# Patient Record
Sex: FEMALE | URBAN - METROPOLITAN AREA
[De-identification: names, ages, dates, MRNs, and addresses within clinical notes are randomized per-mention and may not be internally consistent; named-entity substitution may affect disease eponyms.]

---

## 2020-12-15 ENCOUNTER — OFFICE VISIT (OUTPATIENT)
Dept: URBAN - METROPOLITAN AREA TELEHEALTH 2 | Facility: TELEHEALTH | Age: 55
End: 2020-12-15
Payer: COMMERCIAL

## 2020-12-15 DIAGNOSIS — R15.9 INCONTINENCE OF BOWEL: ICD-10-CM

## 2020-12-15 DIAGNOSIS — K21.9 GERD: ICD-10-CM

## 2020-12-15 DIAGNOSIS — R10.84 GENERALIZED ABDOMINAL PAIN: ICD-10-CM

## 2020-12-15 DIAGNOSIS — R19.7 DIARRHEA: ICD-10-CM

## 2020-12-15 PROCEDURE — 99203 OFFICE O/P NEW LOW 30 MIN: CPT | Performed by: NURSE PRACTITIONER

## 2020-12-15 RX ORDER — DICYCLOMINE HYDROCHLORIDE 20 MG/1
1 TABLET TABLET ORAL
Qty: 120 TABLET | Refills: 6 | OUTPATIENT
Start: 2020-12-15 | End: 2021-07-13

## 2020-12-15 RX ORDER — SODIUM, POTASSIUM,MAG SULFATES 17.5-3.13G
354 ML SOLUTION, RECONSTITUTED, ORAL ORAL
Qty: 354 ML | Refills: 0 | OUTPATIENT
Start: 2020-12-15 | End: 2020-12-16

## 2020-12-15 NOTE — HPI-TODAY'S VISIT:
Ms Roberts is a 56 yo F who presents with [facility] GI complaints. She chronically has periods of loose stool and abdominal pain. SHe states she was dx with IBS in her 40s with no testing or treatment given. She was just told to take prn IBgard. She has lower cramping with 3-4 loose stools daily. There is occasionally blood and nocturnal symptoms. She often passing stool while coughing. SHe also reports 3 episodes of diverticulitis over the last 3 years but she was only given antibx once and never had a CT scan. She also c/o daily heartburn which is relieved with omeprazole 20mg, which she takes nightlys. No autoimmune in the family. maternal grandmother had crc at age 76. she has never had an endoscopy or colonoscopy. no blood thinners. Otherwise, she is a heatlhy an artist. SB  Medications:  low dose hormone-estrogen  Wellbutrin 150mg  prilosec 20mg   PMH: hysterectomy  4 c-sections

## 2021-01-27 ENCOUNTER — OFFICE VISIT (OUTPATIENT)
Dept: URBAN - NONMETROPOLITAN AREA SURGERY CENTER 1 | Facility: SURGERY CENTER | Age: 56
End: 2021-01-27
Payer: COMMERCIAL

## 2021-01-27 DIAGNOSIS — K21.00 ALKALINE REFLUX ESOPHAGITIS: ICD-10-CM

## 2021-01-27 DIAGNOSIS — R10.84 ABDOMINAL CRAMPING, GENERALIZED: ICD-10-CM

## 2021-01-27 DIAGNOSIS — R19.4 ALTERED BOWEL HABITS: ICD-10-CM

## 2021-01-27 DIAGNOSIS — K52.89 (LYMPHOCYTIC) MICROSCOPIC COLITIS: ICD-10-CM

## 2021-01-27 DIAGNOSIS — K31.89 ACQUIRED DEFORMITY OF DUODENUM: ICD-10-CM

## 2021-01-27 PROCEDURE — 43239 EGD BIOPSY SINGLE/MULTIPLE: CPT | Performed by: INTERNAL MEDICINE

## 2021-01-27 PROCEDURE — G8907 PT DOC NO EVENTS ON DISCHARG: HCPCS | Performed by: INTERNAL MEDICINE

## 2021-01-27 PROCEDURE — 45380 COLONOSCOPY AND BIOPSY: CPT | Performed by: INTERNAL MEDICINE

## 2021-01-30 ENCOUNTER — WEB ENCOUNTER (OUTPATIENT)
Dept: URBAN - NONMETROPOLITAN AREA CLINIC 2 | Facility: CLINIC | Age: 56
End: 2021-01-30

## 2021-02-05 ENCOUNTER — TELEPHONE ENCOUNTER (OUTPATIENT)
Dept: URBAN - METROPOLITAN AREA CLINIC 92 | Facility: CLINIC | Age: 56
End: 2021-02-05

## 2021-02-05 RX ORDER — DICYCLOMINE HYDROCHLORIDE 20 MG/1
1 TABLET TABLET ORAL
Qty: 120 TABLET | Refills: 6 | Status: ACTIVE | COMMUNITY
Start: 2020-12-15 | End: 2021-07-13

## 2021-02-05 RX ORDER — RIFAXIMIN 550 MG/1
1 TABLET TABLET ORAL THREE TIMES A DAY
Qty: 42 TABLET | Refills: 2 | OUTPATIENT
Start: 2021-02-09 | End: 2021-03-23

## 2021-02-25 ENCOUNTER — OFFICE VISIT (OUTPATIENT)
Dept: URBAN - NONMETROPOLITAN AREA CLINIC 2 | Facility: CLINIC | Age: 56
End: 2021-02-25

## 2021-02-25 PROBLEM — 235595009 GASTROESOPHAGEAL REFLUX DISEASE: Status: ACTIVE | Noted: 2020-12-15

## 2021-02-25 PROBLEM — 72042002 INCONTINENCE OF FECES: Status: ACTIVE | Noted: 2020-12-15

## 2021-02-25 RX ORDER — RIFAXIMIN 550 MG/1
1 TABLET TABLET ORAL THREE TIMES A DAY
Qty: 42 TABLET | Refills: 2 | Status: ACTIVE | COMMUNITY
Start: 2021-02-09 | End: 2021-03-23

## 2021-02-25 RX ORDER — DICYCLOMINE HYDROCHLORIDE 20 MG/1
1 TABLET TABLET ORAL
Qty: 120 TABLET | Refills: 6 | Status: ACTIVE | COMMUNITY
Start: 2020-12-15 | End: 2021-07-13

## 2021-02-25 NOTE — HPI-TODAY'S VISIT:
Ms Roberts is a 56 yo F who presents with [facility] GI complaints. She chronically has periods of loose stool and abdominal pain. SHe states she was dx with IBS in her 40s with no testing or treatment given. She was just told to take prn IBgard. She has lower cramping with 3-4 loose stools daily. There is occasionally blood and nocturnal symptoms. She often passing stool while coughing. SHe also reports 3 episodes of diverticulitis over the last 3 years but she was only given antibx once and never had a CT scan. She also c/o daily heartburn which is relieved with omeprazole 20mg, which she takes nightlys. No autoimmune in the family. maternal grandmother had crc at age 76. she has never had an endoscopy or colonoscopy. no blood thinners. Otherwise, she is a heatlhy an artist. SB  2/24/2021

## 2024-05-15 ENCOUNTER — DASHBOARD ENCOUNTERS (OUTPATIENT)
Age: 59
End: 2024-05-15

## 2024-05-15 ENCOUNTER — OFFICE VISIT (OUTPATIENT)
Dept: URBAN - NONMETROPOLITAN AREA CLINIC 2 | Facility: CLINIC | Age: 59
End: 2024-05-15
Payer: COMMERCIAL

## 2024-05-15 VITALS
DIASTOLIC BLOOD PRESSURE: 82 MMHG | WEIGHT: 238 LBS | HEART RATE: 85 BPM | TEMPERATURE: 98 F | SYSTOLIC BLOOD PRESSURE: 128 MMHG

## 2024-05-15 DIAGNOSIS — Z80.0 FAMILY HISTORY OF COLON CANCER: ICD-10-CM

## 2024-05-15 DIAGNOSIS — K21.9 GERD: ICD-10-CM

## 2024-05-15 DIAGNOSIS — R19.7 DIARRHEA: ICD-10-CM

## 2024-05-15 DIAGNOSIS — R10.9 ABDOMINAL PAIN: ICD-10-CM

## 2024-05-15 DIAGNOSIS — R15.9 INCONTINENCE OF BOWEL: ICD-10-CM

## 2024-05-15 DIAGNOSIS — R85.7 ABNORMAL SMALL BOWEL BIOPSY: ICD-10-CM

## 2024-05-15 PROCEDURE — 99214 OFFICE O/P EST MOD 30 MIN: CPT | Performed by: NURSE PRACTITIONER

## 2024-05-15 RX ORDER — DICYCLOMINE HYDROCHLORIDE 10 MG/1
1 CAPSULES CAPSULE ORAL
Qty: 60 | Refills: 6 | OUTPATIENT
Start: 2024-05-15 | End: 2024-12-10

## 2024-05-15 RX ORDER — BUDESONIDE 3 MG/1
3 CAPSULES CAPSULE, DELAYED RELEASE PELLETS ORAL ONCE A DAY
Qty: 90 CAPSULE | Refills: 3 | OUTPATIENT
Start: 2024-05-15

## 2024-05-19 LAB
ABSOLUTE BASOPHILS: 41
ABSOLUTE EOSINOPHILS: 102
ABSOLUTE LYMPHOCYTES: 1836
ABSOLUTE MONOCYTES: 362
ABSOLUTE NEUTROPHILS: 2759
ANCA SCREEN: NEGATIVE
BASOPHILS: 0.8
C-REACTIVE PROTEIN, QUANT: 4.2
EOSINOPHILS: 2
GASCA: 5.9
HEMATOCRIT: 48.4
HEMOGLOBIN: 16.2
LYMPHOCYTES: 36
MCH: 30.7
MCHC: 33.5
MCV: 91.8
MONOCYTES: 7.1
MPV: 8.4
MYELOPEROXIDASE ANTIBODY: <1
NEUTROPHILS: 54.1
PLATELET COUNT: 342
PROTEINASE-3 ANTIBODY: <1
RDW: 12.2
RED BLOOD CELL COUNT: 5.27
SACCHAROMYCES CEREVISIAE AB (ASCA) (IGA): 5.4
WHITE BLOOD CELL COUNT: 5.1

## 2024-05-21 ENCOUNTER — WEB ENCOUNTER (OUTPATIENT)
Dept: URBAN - NONMETROPOLITAN AREA CLINIC 2 | Facility: CLINIC | Age: 59
End: 2024-05-21

## 2024-05-22 ENCOUNTER — TELEPHONE ENCOUNTER (OUTPATIENT)
Dept: URBAN - NONMETROPOLITAN AREA CLINIC 2 | Facility: CLINIC | Age: 59
End: 2024-05-22

## 2024-05-22 LAB — CALPROTECTIN, FECAL: 352

## 2024-05-23 ENCOUNTER — WEB ENCOUNTER (OUTPATIENT)
Dept: URBAN - NONMETROPOLITAN AREA CLINIC 2 | Facility: CLINIC | Age: 59
End: 2024-05-23

## 2024-07-10 ENCOUNTER — LAB OUTSIDE AN ENCOUNTER (OUTPATIENT)
Dept: URBAN - NONMETROPOLITAN AREA CLINIC 2 | Facility: CLINIC | Age: 59
End: 2024-07-10

## 2024-07-10 ENCOUNTER — OFFICE VISIT (OUTPATIENT)
Dept: URBAN - NONMETROPOLITAN AREA CLINIC 2 | Facility: CLINIC | Age: 59
End: 2024-07-10
Payer: COMMERCIAL

## 2024-07-10 VITALS
SYSTOLIC BLOOD PRESSURE: 153 MMHG | DIASTOLIC BLOOD PRESSURE: 78 MMHG | HEART RATE: 83 BPM | WEIGHT: 232 LBS | HEIGHT: 65 IN | TEMPERATURE: 98 F | BODY MASS INDEX: 38.65 KG/M2

## 2024-07-10 DIAGNOSIS — R19.7 DIARRHEA: ICD-10-CM

## 2024-07-10 DIAGNOSIS — R10.84 ABDOMINAL CRAMPING, GENERALIZED: ICD-10-CM

## 2024-07-10 DIAGNOSIS — K21.9 GERD: ICD-10-CM

## 2024-07-10 DIAGNOSIS — R15.9 INCONTINENCE OF BOWEL: ICD-10-CM

## 2024-07-10 PROCEDURE — 99214 OFFICE O/P EST MOD 30 MIN: CPT | Performed by: NURSE PRACTITIONER

## 2024-07-10 RX ORDER — BUDESONIDE 3 MG/1
3 CAPSULES CAPSULE, DELAYED RELEASE PELLETS ORAL ONCE A DAY
Qty: 90 CAPSULE | Refills: 3 | OUTPATIENT

## 2024-07-10 RX ORDER — SODIUM, POTASSIUM,MAG SULFATES 17.5-3.13G
AS DIRECTED SOLUTION, RECONSTITUTED, ORAL ORAL
Qty: 1 | Refills: 0 | OUTPATIENT
Start: 2024-07-10 | End: 2024-07-12

## 2024-07-10 RX ORDER — DICYCLOMINE HYDROCHLORIDE 10 MG/1
1 CAPSULES CAPSULE ORAL
Qty: 60 | Refills: 6 | Status: ACTIVE | COMMUNITY
Start: 2024-05-15 | End: 2024-12-10

## 2024-07-10 RX ORDER — DICYCLOMINE HYDROCHLORIDE 10 MG/1
1 CAPSULES CAPSULE ORAL
Qty: 60 | Refills: 6 | OUTPATIENT

## 2024-07-10 RX ORDER — BUDESONIDE 3 MG/1
3 CAPSULES CAPSULE, DELAYED RELEASE PELLETS ORAL ONCE A DAY
Qty: 90 CAPSULE | Refills: 3 | Status: ACTIVE | COMMUNITY
Start: 2024-05-15

## 2024-07-10 NOTE — HPI-TODAY'S VISIT:
Patient is a pleasant 59-year-old female who presents for diarrhea.  She was last seen in 2021 and had an endoscopy and colonoscopy at that time.  She had some mild esophagitis as well as gastritis, colon with diverticulosis as well as nonspecific ileitis with a possible ulcers.  She has not been seen since follow-up.  She complains of right lower quad abdominal discomfort, alternating loose stool and constipation.  She will have a mucus and blood in her stools at times. Sb  symptoms resolved wtih budesonide, but forgot it on vacation and symptoms returned. calpro in 350s. sb

## 2024-07-25 ENCOUNTER — TELEPHONE ENCOUNTER (OUTPATIENT)
Dept: URBAN - NONMETROPOLITAN AREA CLINIC 2 | Facility: CLINIC | Age: 59
End: 2024-07-25

## 2024-07-25 LAB
HEREDITARY HEMOCHROMATOSIS DNA MUT: (no result)
IRON BIND.CAP.(TIBC): 334
IRON SATURATION: 43
IRON: 142

## 2024-09-17 ENCOUNTER — TELEPHONE ENCOUNTER (OUTPATIENT)
Dept: URBAN - NONMETROPOLITAN AREA CLINIC 2 | Facility: CLINIC | Age: 59
End: 2024-09-17

## 2024-09-17 ENCOUNTER — CLAIMS CREATED FROM THE CLAIM WINDOW (OUTPATIENT)
Dept: URBAN - NONMETROPOLITAN AREA SURGERY CENTER 1 | Facility: SURGERY CENTER | Age: 59
End: 2024-09-17
Payer: COMMERCIAL

## 2024-09-17 ENCOUNTER — CLAIMS CREATED FROM THE CLAIM WINDOW (OUTPATIENT)
Dept: URBAN - METROPOLITAN AREA CLINIC 4 | Facility: CLINIC | Age: 59
End: 2024-09-17
Payer: COMMERCIAL

## 2024-09-17 ENCOUNTER — LAB OUTSIDE AN ENCOUNTER (OUTPATIENT)
Dept: URBAN - NONMETROPOLITAN AREA CLINIC 2 | Facility: CLINIC | Age: 59
End: 2024-09-17

## 2024-09-17 DIAGNOSIS — R19.7 ACUTE DIARRHEA: ICD-10-CM

## 2024-09-17 DIAGNOSIS — K57.30 ACQUIRED DIVERTICULOSIS OF COLON: ICD-10-CM

## 2024-09-17 DIAGNOSIS — K57.30 DIVERTICULOSIS OF SIGMOID COLON: ICD-10-CM

## 2024-09-17 DIAGNOSIS — K50.00 CROHN'S DISEASE OF SMALL INTESTINE WITHOUT COMPLICATIONS: ICD-10-CM

## 2024-09-17 DIAGNOSIS — K63.3 EROSION OF TERMINAL ILEUM: ICD-10-CM

## 2024-09-17 DIAGNOSIS — K50.10 CROHN'S DISEASE, COLON: ICD-10-CM

## 2024-09-17 PROBLEM — 38106008: Status: ACTIVE | Noted: 2024-09-17

## 2024-09-17 PROCEDURE — 45380 COLONOSCOPY AND BIOPSY: CPT | Performed by: INTERNAL MEDICINE

## 2024-09-17 PROCEDURE — 00811 ANES LWR INTST NDSC NOS: CPT | Performed by: NURSE ANESTHETIST, CERTIFIED REGISTERED

## 2024-09-17 PROCEDURE — 88305 TISSUE EXAM BY PATHOLOGIST: CPT | Performed by: PATHOLOGY

## 2024-09-17 RX ORDER — DICYCLOMINE HYDROCHLORIDE 10 MG/1
1 CAPSULES CAPSULE ORAL
Qty: 60 | Refills: 6 | Status: ACTIVE | COMMUNITY

## 2024-09-17 RX ORDER — BUDESONIDE 3 MG/1
3 CAPSULES CAPSULE, DELAYED RELEASE PELLETS ORAL ONCE A DAY
Qty: 90 CAPSULE | Refills: 3 | Status: ACTIVE | COMMUNITY

## 2024-10-30 ENCOUNTER — TELEPHONE ENCOUNTER (OUTPATIENT)
Dept: URBAN - NONMETROPOLITAN AREA CLINIC 2 | Facility: CLINIC | Age: 59
End: 2024-10-30

## 2024-10-30 ENCOUNTER — OFFICE VISIT (OUTPATIENT)
Dept: URBAN - NONMETROPOLITAN AREA CLINIC 2 | Facility: CLINIC | Age: 59
End: 2024-10-30
Payer: COMMERCIAL

## 2024-10-30 VITALS
WEIGHT: 230 LBS | HEIGHT: 65 IN | BODY MASS INDEX: 38.32 KG/M2 | SYSTOLIC BLOOD PRESSURE: 121 MMHG | HEART RATE: 65 BPM | DIASTOLIC BLOOD PRESSURE: 51 MMHG | TEMPERATURE: 98 F

## 2024-10-30 DIAGNOSIS — R19.7 DIARRHEA: ICD-10-CM

## 2024-10-30 DIAGNOSIS — R15.9 INCONTINENCE OF BOWEL: ICD-10-CM

## 2024-10-30 DIAGNOSIS — R85.7 ABNORMAL SMALL BOWEL BIOPSY: ICD-10-CM

## 2024-10-30 DIAGNOSIS — R10.9 ABDOMINAL PAIN: ICD-10-CM

## 2024-10-30 DIAGNOSIS — Z80.0 FAMILY HISTORY OF COLON CANCER: ICD-10-CM

## 2024-10-30 DIAGNOSIS — K21.9 GERD: ICD-10-CM

## 2024-10-30 DIAGNOSIS — Z83.49 FAMILY HISTORY OF HEMOCHROMATOSIS: ICD-10-CM

## 2024-10-30 DIAGNOSIS — K50.011 CROHN'S DISEASE OF SMALL INTESTINE WITH RECTAL BLEEDING: ICD-10-CM

## 2024-10-30 PROBLEM — 56689002: Status: ACTIVE | Noted: 2024-10-30

## 2024-10-30 PROCEDURE — 99214 OFFICE O/P EST MOD 30 MIN: CPT | Performed by: NURSE PRACTITIONER

## 2024-10-30 RX ORDER — DICYCLOMINE HYDROCHLORIDE 10 MG/1
1 CAPSULES CAPSULE ORAL
Qty: 60 | Refills: 6 | Status: ACTIVE | COMMUNITY

## 2024-10-30 RX ORDER — BUDESONIDE 3 MG/1
3 CAPSULES CAPSULE, DELAYED RELEASE PELLETS ORAL ONCE A DAY
Qty: 90 CAPSULE | Refills: 3 | Status: ACTIVE | COMMUNITY

## 2024-10-30 RX ORDER — PREDNISONE 20 MG/1
40MG X7 DAYS, 30MGX 7 DAYS, 20MG X 7 DAYS, AND 10MG X7 DAYS AND STOP TABLET ORAL ONCE A DAY
Qty: 35 TABLET | Refills: 0 | OUTPATIENT
Start: 2024-10-30 | End: 2024-11-26

## 2024-10-30 RX ORDER — BUDESONIDE 3 MG/1
3 CAPSULES CAPSULE, DELAYED RELEASE PELLETS ORAL ONCE A DAY
Qty: 90 CAPSULE | Refills: 3 | OUTPATIENT

## 2024-10-30 RX ORDER — DICYCLOMINE HYDROCHLORIDE 10 MG/1
1 CAPSULES CAPSULE ORAL
Qty: 60 | Refills: 6 | OUTPATIENT

## 2024-10-30 NOTE — HPI-TODAY'S VISIT:
inital visit: Patient is a pleasant 59-year-old female who presents for diarrhea.  She was last seen in 2021 and had an endoscopy and colonoscopy at that time.  She had some mild esophagitis as well as gastritis, colon with diverticulosis as well as nonspecific ileitis with a possible ulcers.  She has not been seen since follow-up.  She complains of right lower quad abdominal discomfort, alternating loose stool and constipation.  She will have a mucus and blood in her stools at times. Sb  symptoms resolved wtih budesonide, but forgot it on vacation and symptoms returned. calpro in 350s. sb 2024 Colonoscopy patchy erosions TI, mild narrowing, path consistent with crohns  10/30/2024 Margie returns for follow-up to discuss newly diagnosed Crohn's.  She is doing poorly.  She is waking up at night with nocturnal complaints as well as blood in the stool and cramping.  She has lost about 20 pounds unintentionally over the last recent months.  We had a lengthy discussion about Crohn's diagnosis.  The budesonide is no longer helping, but she is agreeable to steroids today.  Her MRA is pending tomorrow and we are planning on starting her on Skyrizi. Sb

## 2024-11-04 ENCOUNTER — WEB ENCOUNTER (OUTPATIENT)
Dept: URBAN - NONMETROPOLITAN AREA CLINIC 2 | Facility: CLINIC | Age: 59
End: 2024-11-04

## 2024-11-05 ENCOUNTER — TELEPHONE ENCOUNTER (OUTPATIENT)
Dept: URBAN - NONMETROPOLITAN AREA CLINIC 2 | Facility: CLINIC | Age: 59
End: 2024-11-05

## 2024-11-05 RX ORDER — RISANKIZUMAB-RZAA 60 MG/ML
AS DIRECTED INJECTION INTRAVENOUS
Qty: 600 | Refills: 0 | OUTPATIENT
Start: 2024-11-05

## 2024-11-05 RX ORDER — RISANKIZUMAB-RZAA 360 MG/2.4
AT WEEK 12 WEARABLE INJECTOR SUBCUTANEOUS
Qty: 360 | Refills: 0 | OUTPATIENT
Start: 2024-11-05

## 2024-11-14 ENCOUNTER — TELEPHONE ENCOUNTER (OUTPATIENT)
Dept: URBAN - NONMETROPOLITAN AREA CLINIC 2 | Facility: CLINIC | Age: 59
End: 2024-11-14

## 2024-11-14 RX ORDER — AZITHROMYCIN MONOHYDRATE 250 MG/1
TAKE 2 PILLS DAILY ONE AND THEN 1 PILL DAILY FOR 4 ADDITIONAL DAYS TABLET, FILM COATED ORAL ONCE DAILY
Qty: 6 | Refills: 0 | OUTPATIENT
Start: 2024-11-14 | End: 2024-11-19

## 2024-12-05 NOTE — PHYSICAL EXAM PSYCH:
Health Maintenance       DTaP/Tdap/Td Vaccine (1 - Tdap)  Never done    Shingles Vaccine (2 of 2)  Overdue since 11/6/2024    Colorectal Cancer Risk - Colonoscopy (Every 5 Years)  Due soon on 12/17/2024           Following review of the above:  Patient is not proceeding with: Colorectal Cancer Screening, Dtap/Tdap/Td, and Shingles    Note: Refer to final orders and clinician documentation.       normal mood with appropriate affect

## 2024-12-17 ENCOUNTER — OFFICE VISIT (OUTPATIENT)
Dept: URBAN - NONMETROPOLITAN AREA CLINIC 1 | Facility: CLINIC | Age: 59
End: 2024-12-17
Payer: COMMERCIAL

## 2024-12-17 ENCOUNTER — TELEPHONE ENCOUNTER (OUTPATIENT)
Dept: URBAN - NONMETROPOLITAN AREA CLINIC 2 | Facility: CLINIC | Age: 59
End: 2024-12-17

## 2024-12-17 VITALS
HEART RATE: 79 BPM | RESPIRATION RATE: 20 BRPM | SYSTOLIC BLOOD PRESSURE: 144 MMHG | TEMPERATURE: 97.5 F | BODY MASS INDEX: 41.19 KG/M2 | HEIGHT: 65 IN | WEIGHT: 247.2 LBS | DIASTOLIC BLOOD PRESSURE: 80 MMHG

## 2024-12-17 DIAGNOSIS — K50.80 CROHN'S COLITIS: ICD-10-CM

## 2024-12-17 PROCEDURE — 96413 CHEMO IV INFUSION 1 HR: CPT | Performed by: INTERNAL MEDICINE

## 2024-12-17 RX ORDER — RISANKIZUMAB-RZAA 360 MG/2.4
AT WEEK 12 WEARABLE INJECTOR SUBCUTANEOUS
Qty: 360 | Refills: 0 | Status: ACTIVE | COMMUNITY
Start: 2024-11-05

## 2024-12-17 RX ORDER — RISANKIZUMAB-RZAA 60 MG/ML
AS DIRECTED INJECTION INTRAVENOUS
Qty: 600 | Refills: 0 | Status: ACTIVE | COMMUNITY
Start: 2024-11-05

## 2024-12-17 RX ORDER — DICYCLOMINE HYDROCHLORIDE 10 MG/1
1 CAPSULES CAPSULE ORAL
Qty: 60 | Refills: 6 | Status: ACTIVE | COMMUNITY

## 2024-12-17 RX ORDER — BUDESONIDE 3 MG/1
3 CAPSULES CAPSULE, DELAYED RELEASE PELLETS ORAL ONCE A DAY
Qty: 90 CAPSULE | Refills: 3 | Status: ACTIVE | COMMUNITY

## 2025-01-09 ENCOUNTER — OFFICE VISIT (OUTPATIENT)
Dept: URBAN - NONMETROPOLITAN AREA CLINIC 2 | Facility: CLINIC | Age: 60
End: 2025-01-09
Payer: COMMERCIAL

## 2025-01-09 VITALS
TEMPERATURE: 98 F | HEIGHT: 65 IN | BODY MASS INDEX: 40.98 KG/M2 | HEART RATE: 81 BPM | WEIGHT: 246 LBS | DIASTOLIC BLOOD PRESSURE: 72 MMHG | SYSTOLIC BLOOD PRESSURE: 136 MMHG

## 2025-01-09 DIAGNOSIS — K21.9 GERD: ICD-10-CM

## 2025-01-09 DIAGNOSIS — Z83.49 FAMILY HISTORY OF HEMOCHROMATOSIS: ICD-10-CM

## 2025-01-09 DIAGNOSIS — R79.89 LOW VITAMIN B12 LEVEL: ICD-10-CM

## 2025-01-09 DIAGNOSIS — K50.011 CROHN'S DISEASE OF SMALL INTESTINE WITH RECTAL BLEEDING: ICD-10-CM

## 2025-01-09 DIAGNOSIS — Z80.0 FAMILY HISTORY OF COLON CANCER: ICD-10-CM

## 2025-01-09 DIAGNOSIS — R85.7 ABNORMAL SMALL BOWEL BIOPSY: ICD-10-CM

## 2025-01-09 PROCEDURE — 99214 OFFICE O/P EST MOD 30 MIN: CPT | Performed by: NURSE PRACTITIONER

## 2025-01-09 RX ORDER — CYANOCOBALAMIN 1000 UG/ML
1 ML. PLEASE ALSO SUPPLY SYRINGE/NEEDLE INJECTION INTRAMUSCULAR; SUBCUTANEOUS
Qty: 4 | Refills: 11 | OUTPATIENT
Start: 2025-01-09 | End: 2026-01-04

## 2025-01-09 RX ORDER — CHOLECALCIFEROL (VITAMIN D3) 125 MCG
1 TABLET TABLET ORAL ONCE A DAY
Qty: 30 | OUTPATIENT
Start: 2025-01-09 | End: 2025-02-08

## 2025-01-09 RX ORDER — RISANKIZUMAB-RZAA 360 MG/2.4
AT WEEK 12 WEARABLE INJECTOR SUBCUTANEOUS
Qty: 360 | Refills: 0 | Status: ACTIVE | COMMUNITY
Start: 2024-11-05

## 2025-01-09 RX ORDER — BUDESONIDE 3 MG/1
3 CAPSULES CAPSULE, DELAYED RELEASE PELLETS ORAL ONCE A DAY
Qty: 90 CAPSULE | Refills: 3 | OUTPATIENT

## 2025-01-09 RX ORDER — DICYCLOMINE HYDROCHLORIDE 10 MG/1
1 CAPSULES CAPSULE ORAL
Qty: 60 | Refills: 6 | Status: ACTIVE | COMMUNITY

## 2025-01-09 RX ORDER — RISANKIZUMAB-RZAA 60 MG/ML
AS DIRECTED INJECTION INTRAVENOUS
Qty: 600 | Refills: 0 | Status: ACTIVE | COMMUNITY
Start: 2024-11-05

## 2025-01-09 RX ORDER — DICYCLOMINE HYDROCHLORIDE 10 MG/1
1 CAPSULES CAPSULE ORAL
Qty: 60 | Refills: 6 | OUTPATIENT

## 2025-01-09 NOTE — HPI-TODAY'S VISIT:
inital visit: Patient is a pleasant 59-year-old female who presents for diarrhea.  She was last seen in 2021 and had an endoscopy and colonoscopy at that time.  She had some mild esophagitis as well as gastritis, colon with diverticulosis as well as nonspecific ileitis with a possible ulcers.  She has not been seen since follow-up.  She complains of right lower quad abdominal discomfort, alternating loose stool and constipation.  She will have a mucus and blood in her stools at times. Sb  symptoms resolved wtih budesonide, but forgot it on vacation and symptoms returned. calpro in 350s. sb 2024 Colonoscopy patchy erosions TI, mild narrowing, path consistent with crohns  10/30/2024 Margie returns for follow-up to discuss newly diagnosed Crohn's.  She is doing poorly.  She is waking up at night with nocturnal complaints as well as blood in the stool and cramping.  She has lost about 20 pounds unintentionally over the last recent months.  We had a lengthy discussion about Crohn's diagnosis.  The budesonide is no longer helping, but she is agreeable to steroids today.  Her MRA is pending tomorrow and we are planning on starting her on Skyrizi. Sb 1/9/2025 Margie returns for follow-up of small bowel Crohn's.  She is currently off of steroids.  She had her first Skyrizi infusion.  She still struggling with a lot of symptoms.  She does complain of diarrhea and urgency as well as tenesmus.  She is starting to lose hair.  She did develops rash on her chest during Skyrizi infusion initially, but this resolved.  She is agreeable to proceeding with next infusion. Sb

## 2025-01-14 ENCOUNTER — OFFICE VISIT (OUTPATIENT)
Dept: URBAN - NONMETROPOLITAN AREA CLINIC 1 | Facility: CLINIC | Age: 60
End: 2025-01-14
Payer: COMMERCIAL

## 2025-01-14 VITALS
HEIGHT: 65 IN | SYSTOLIC BLOOD PRESSURE: 150 MMHG | DIASTOLIC BLOOD PRESSURE: 85 MMHG | HEART RATE: 65 BPM | BODY MASS INDEX: 41.65 KG/M2 | WEIGHT: 250 LBS | TEMPERATURE: 97.5 F

## 2025-01-14 DIAGNOSIS — K50.011 CROHN'S DISEASE OF SMALL INTESTINE WITH RECTAL BLEEDING: ICD-10-CM

## 2025-01-14 PROCEDURE — 96413 CHEMO IV INFUSION 1 HR: CPT | Performed by: INTERNAL MEDICINE

## 2025-01-14 RX ORDER — CHOLECALCIFEROL (VITAMIN D3) 125 MCG
1 TABLET TABLET ORAL ONCE A DAY
Qty: 30 | Status: ACTIVE | COMMUNITY
Start: 2025-01-09 | End: 2025-02-08

## 2025-01-14 RX ORDER — BUDESONIDE 3 MG/1
3 CAPSULES CAPSULE, DELAYED RELEASE PELLETS ORAL ONCE A DAY
Qty: 90 CAPSULE | Refills: 3 | Status: ACTIVE | COMMUNITY

## 2025-01-14 RX ORDER — RISANKIZUMAB-RZAA 60 MG/ML
AS DIRECTED INJECTION INTRAVENOUS
Qty: 600 | Refills: 0 | Status: ACTIVE | COMMUNITY
Start: 2024-11-05

## 2025-01-14 RX ORDER — DICYCLOMINE HYDROCHLORIDE 10 MG/1
1 CAPSULES CAPSULE ORAL
Qty: 60 | Refills: 6 | Status: ACTIVE | COMMUNITY

## 2025-01-14 RX ORDER — CYANOCOBALAMIN 1000 UG/ML
1 ML. PLEASE ALSO SUPPLY SYRINGE/NEEDLE INJECTION INTRAMUSCULAR; SUBCUTANEOUS
Qty: 4 | Refills: 11 | Status: ACTIVE | COMMUNITY
Start: 2025-01-09 | End: 2026-01-04

## 2025-01-14 RX ORDER — RISANKIZUMAB-RZAA 360 MG/2.4
AT WEEK 12 WEARABLE INJECTOR SUBCUTANEOUS
Qty: 360 | Refills: 0 | Status: ACTIVE | COMMUNITY
Start: 2024-11-05

## 2025-02-01 ENCOUNTER — P2P PATIENT RECORD (OUTPATIENT)
Age: 60
End: 2025-02-01

## 2025-02-07 ENCOUNTER — ERX REFILL RESPONSE (OUTPATIENT)
Dept: URBAN - NONMETROPOLITAN AREA CLINIC 2 | Facility: CLINIC | Age: 60
End: 2025-02-07

## 2025-02-07 RX ORDER — RISANKIZUMAB-RZAA 360 MG/2.4
INJECT 360MG UNDER THE SKIN AT WEEKS 12 THEN EVERY 8 WEEKS THEREAFTER KIT SUBCUTANEOUS
Qty: 2.4 MILLILITER | Refills: 0 | OUTPATIENT

## 2025-02-07 RX ORDER — RISANKIZUMAB-RZAA 360 MG/2.4
INJECT 360MG UNDER THE SKIN AT WEEKS 12 THEN EVERY 8 WEEKS THEREAFTER KIT SUBCUTANEOUS
Qty: 2.4 MILLILITER | Refills: 6 | OUTPATIENT

## 2025-02-12 ENCOUNTER — OFFICE VISIT (OUTPATIENT)
Dept: URBAN - NONMETROPOLITAN AREA CLINIC 1 | Facility: CLINIC | Age: 60
End: 2025-02-12
Payer: COMMERCIAL

## 2025-02-12 VITALS
HEIGHT: 65 IN | DIASTOLIC BLOOD PRESSURE: 84 MMHG | TEMPERATURE: 97.8 F | BODY MASS INDEX: 42.32 KG/M2 | WEIGHT: 254 LBS | SYSTOLIC BLOOD PRESSURE: 152 MMHG

## 2025-02-12 DIAGNOSIS — K50.011 CROHN'S DISEASE OF SMALL INTESTINE WITH RECTAL BLEEDING: ICD-10-CM

## 2025-02-12 PROCEDURE — 96413 CHEMO IV INFUSION 1 HR: CPT | Performed by: INTERNAL MEDICINE

## 2025-02-12 RX ORDER — RISANKIZUMAB-RZAA 60 MG/ML
AS DIRECTED INJECTION INTRAVENOUS
Qty: 600 | Refills: 0 | Status: ACTIVE | COMMUNITY
Start: 2024-11-05

## 2025-02-12 RX ORDER — CYANOCOBALAMIN 1000 UG/ML
1 ML. PLEASE ALSO SUPPLY SYRINGE/NEEDLE INJECTION INTRAMUSCULAR; SUBCUTANEOUS
Qty: 4 | Refills: 11 | Status: ACTIVE | COMMUNITY
Start: 2025-01-09 | End: 2026-01-04

## 2025-02-12 RX ORDER — RISANKIZUMAB-RZAA 360 MG/2.4
AT WEEK 12 WEARABLE INJECTOR SUBCUTANEOUS
Qty: 360 | Refills: 0 | Status: ACTIVE | COMMUNITY
Start: 2024-11-05

## 2025-02-12 RX ORDER — BUDESONIDE 3 MG/1
3 CAPSULES CAPSULE, DELAYED RELEASE PELLETS ORAL ONCE A DAY
Qty: 90 CAPSULE | Refills: 3 | Status: ACTIVE | COMMUNITY

## 2025-02-12 RX ORDER — RISANKIZUMAB-RZAA 360 MG/2.4
INJECT 360MG UNDER THE SKIN AT WEEKS 12 THEN EVERY 8 WEEKS THEREAFTER KIT SUBCUTANEOUS
Qty: 2.4 MILLILITER | Refills: 6 | Status: ACTIVE | COMMUNITY

## 2025-02-12 RX ORDER — DICYCLOMINE HYDROCHLORIDE 10 MG/1
1 CAPSULES CAPSULE ORAL
Qty: 60 | Refills: 6 | Status: ACTIVE | COMMUNITY

## 2025-04-04 ENCOUNTER — OFFICE VISIT (OUTPATIENT)
Dept: URBAN - NONMETROPOLITAN AREA CLINIC 2 | Facility: CLINIC | Age: 60
End: 2025-04-04
Payer: COMMERCIAL

## 2025-04-04 DIAGNOSIS — Z83.49 FAMILY HISTORY OF HEMOCHROMATOSIS: ICD-10-CM

## 2025-04-04 DIAGNOSIS — R79.89 LOW VITAMIN B12 LEVEL: ICD-10-CM

## 2025-04-04 DIAGNOSIS — K50.011 CROHN'S DISEASE OF SMALL INTESTINE WITH RECTAL BLEEDING: ICD-10-CM

## 2025-04-04 DIAGNOSIS — K21.9 GERD: ICD-10-CM

## 2025-04-04 DIAGNOSIS — K59.09 CHRONIC CONSTIPATION: ICD-10-CM

## 2025-04-04 DIAGNOSIS — Z80.0 FAMILY HISTORY OF COLON CANCER: ICD-10-CM

## 2025-04-04 PROCEDURE — 99214 OFFICE O/P EST MOD 30 MIN: CPT | Performed by: INTERNAL MEDICINE

## 2025-04-04 RX ORDER — RISANKIZUMAB-RZAA 360 MG/2.4
AT WEEK 12 WEARABLE INJECTOR SUBCUTANEOUS
Qty: 360 | Refills: 0 | Status: ACTIVE | COMMUNITY
Start: 2024-11-05

## 2025-04-04 RX ORDER — RISANKIZUMAB-RZAA 360 MG/2.4
INJECT 360MG UNDER THE SKIN AT WEEKS 12 THEN EVERY 8 WEEKS THEREAFTER KIT SUBCUTANEOUS
Qty: 2.4 MILLILITER | Refills: 6 | Status: ACTIVE | COMMUNITY

## 2025-04-04 RX ORDER — BUDESONIDE 3 MG/1
3 CAPSULES CAPSULE, DELAYED RELEASE PELLETS ORAL ONCE A DAY
Qty: 90 CAPSULE | Refills: 3 | Status: ACTIVE | COMMUNITY

## 2025-04-04 RX ORDER — RISANKIZUMAB-RZAA 60 MG/ML
AS DIRECTED INJECTION INTRAVENOUS
Qty: 600 | Refills: 0 | Status: ACTIVE | COMMUNITY
Start: 2024-11-05

## 2025-04-04 RX ORDER — DICYCLOMINE HYDROCHLORIDE 10 MG/1
1 CAPSULES CAPSULE ORAL
Qty: 60 | Refills: 6 | Status: ACTIVE | COMMUNITY

## 2025-04-04 RX ORDER — CYANOCOBALAMIN 1000 UG/ML
1 ML. PLEASE ALSO SUPPLY SYRINGE/NEEDLE INJECTION INTRAMUSCULAR; SUBCUTANEOUS
Qty: 4 | Refills: 11 | Status: ACTIVE | COMMUNITY
Start: 2025-01-09 | End: 2026-01-04

## 2025-04-04 RX ORDER — CYANOCOBALAMIN 1000 UG/ML
1 ML. PLEASE ALSO SUPPLY SYRINGE/NEEDLE INJECTION INTRAMUSCULAR; SUBCUTANEOUS
Qty: 4 | Refills: 11 | OUTPATIENT
Start: 2025-04-04

## 2025-04-04 NOTE — HPI-TODAY'S VISIT:
inital visit: Patient is a pleasant 59-year-old female who presents for diarrhea.  She was last seen in 2021 and had an endoscopy and colonoscopy at that time.  She had some mild esophagitis as well as gastritis, colon with diverticulosis as well as nonspecific ileitis with a possible ulcers.  She has not been seen since follow-up.  She complains of right lower quad abdominal discomfort, alternating loose stool and constipation.  She will have a mucus and blood in her stools at times. Sb  symptoms resolved wtih budesonide, but forgot it on vacation and symptoms returned. calpro in 350s. sb 2024 Colonoscopy patchy erosions TI, mild narrowing, path consistent with crohns  10/30/2024 Margie returns for follow-up to discuss newly diagnosed Crohn's.  She is doing poorly.  She is waking up at night with nocturnal complaints as well as blood in the stool and cramping.  She has lost about 20 pounds unintentionally over the last recent months.  We had a lengthy discussion about Crohn's diagnosis.  The budesonide is no longer helping, but she is agreeable to steroids today.  Her MRA is pending tomorrow and we are planning on starting her on Skyrizi. Sb  1/9/2025 Margie returns for follow-up of small bowel Crohn's.  She is currently off of steroids.  She had her first Skyrizi infusion.  She still struggling with a lot of symptoms.  She does complain of diarrhea and urgency as well as tenesmus.  She is starting to lose hair.  She did develops rash on her chest during Skyrizi infusion initially, but this resolved.  She is agreeable to proceeding with next infusion. Sb  4/4/25: Ms. Alvarado returns to GI clinic for follow-up of ileal Crohn's disease.  She has been on Skyrizi.  At last visit, she was placed on back on budesonide as a temporizing measure waiting for Skyrizi to catch up.  She has been doing better on Skyrizi.  Abdominal pain and diarrhea have mostly resolved.  She is off budesonide.  She has improved energy.  She has gained a bit of weight as she is eating better.  She actually has some constipation now.

## 2025-05-28 ENCOUNTER — TELEPHONE ENCOUNTER (OUTPATIENT)
Dept: URBAN - NONMETROPOLITAN AREA CLINIC 2 | Facility: CLINIC | Age: 60
End: 2025-05-28

## 2025-05-28 RX ORDER — RISANKIZUMAB-RZAA 360 MG/2.4
2.4 ML WEARABLE INJECTOR SUBCUTANEOUS
Qty: 1 | Refills: 5
Start: 2024-11-05 | End: 2026-04-29

## 2025-06-03 ENCOUNTER — OFFICE VISIT (OUTPATIENT)
Dept: URBAN - METROPOLITAN AREA TELEHEALTH 2 | Facility: TELEHEALTH | Age: 60
End: 2025-06-03
Payer: COMMERCIAL

## 2025-06-03 ENCOUNTER — TELEPHONE ENCOUNTER (OUTPATIENT)
Dept: URBAN - NONMETROPOLITAN AREA CLINIC 2 | Facility: CLINIC | Age: 60
End: 2025-06-03

## 2025-06-03 DIAGNOSIS — K59.09 CHRONIC CONSTIPATION: ICD-10-CM

## 2025-06-03 DIAGNOSIS — K50.011 CROHN'S DISEASE OF SMALL INTESTINE WITH RECTAL BLEEDING: ICD-10-CM

## 2025-06-03 DIAGNOSIS — R79.89 LOW VITAMIN B12 LEVEL: ICD-10-CM

## 2025-06-03 DIAGNOSIS — Z83.49 FAMILY HISTORY OF HEMOCHROMATOSIS: ICD-10-CM

## 2025-06-03 DIAGNOSIS — K21.9 GERD: ICD-10-CM

## 2025-06-03 DIAGNOSIS — Z80.0 FAMILY HISTORY OF COLON CANCER: ICD-10-CM

## 2025-06-03 PROCEDURE — 99213 OFFICE O/P EST LOW 20 MIN: CPT | Performed by: NURSE PRACTITIONER

## 2025-06-03 RX ORDER — BUDESONIDE 3 MG/1
3 CAPSULES CAPSULE, DELAYED RELEASE PELLETS ORAL ONCE A DAY
Qty: 90 CAPSULE | Refills: 3 | OUTPATIENT
Start: 2025-06-03

## 2025-06-03 RX ORDER — RISANKIZUMAB-RZAA 360 MG/2.4
INJECT 360MG UNDER THE SKIN AT WEEKS 12 THEN EVERY 8 WEEKS THEREAFTER KIT SUBCUTANEOUS
Qty: 2.4 MILLILITER | Refills: 6 | Status: ACTIVE | COMMUNITY

## 2025-06-03 RX ORDER — RISANKIZUMAB-RZAA 60 MG/ML
AS DIRECTED INJECTION INTRAVENOUS
Qty: 600 | Refills: 0 | Status: ACTIVE | COMMUNITY
Start: 2024-11-05

## 2025-06-03 RX ORDER — CYANOCOBALAMIN 1000 UG/ML
1 ML. PLEASE ALSO SUPPLY SYRINGE/NEEDLE INJECTION INTRAMUSCULAR; SUBCUTANEOUS
Qty: 4 | Refills: 11 | Status: ACTIVE | COMMUNITY
Start: 2025-04-04

## 2025-06-03 RX ORDER — DICYCLOMINE HYDROCHLORIDE 10 MG/1
1 CAPSULES CAPSULE ORAL
Qty: 60 | Refills: 6 | Status: ACTIVE | COMMUNITY

## 2025-06-03 RX ORDER — BUDESONIDE 3 MG/1
3 CAPSULES CAPSULE, DELAYED RELEASE PELLETS ORAL ONCE A DAY
Qty: 90 CAPSULE | Refills: 3 | Status: ACTIVE | COMMUNITY

## 2025-06-03 RX ORDER — RISANKIZUMAB-RZAA 360 MG/2.4
2.4 ML WEARABLE INJECTOR SUBCUTANEOUS
Qty: 1 | Refills: 5 | Status: ACTIVE | COMMUNITY
Start: 2024-11-05 | End: 2026-04-29

## 2025-06-03 NOTE — HPI-TODAY'S VISIT:
inital visit: Patient is a pleasant 59-year-old female who presents for diarrhea.  She was last seen in 2021 and had an endoscopy and colonoscopy at that time.  She had some mild esophagitis as well as gastritis, colon with diverticulosis as well as nonspecific ileitis with a possible ulcers.  She has not been seen since follow-up.  She complains of right lower quad abdominal discomfort, alternating loose stool and constipation.  She will have a mucus and blood in her stools at times. Sb  symptoms resolved wtih budesonide, but forgot it on vacation and symptoms returned. calpro in 350s. sb 2024 Colonoscopy patchy erosions TI, mild narrowing, path consistent with crohns  10/30/2024 Margie returns for follow-up to discuss newly diagnosed Crohn's.  She is doing poorly.  She is waking up at night with nocturnal complaints as well as blood in the stool and cramping.  She has lost about 20 pounds unintentionally over the last recent months.  We had a lengthy discussion about Crohn's diagnosis.  The budesonide is no longer helping, but she is agreeable to steroids today.  Her MRA is pending tomorrow and we are planning on starting her on Skyrizi. Sb  1/9/2025 Margie returns for follow-up of small bowel Crohn's.  She is currently off of steroids.  She had her first Skyrizi infusion.  She still struggling with a lot of symptoms.  She does complain of diarrhea and urgency as well as tenesmus.  She is starting to lose hair.  She did develops rash on her chest during Skyrizi infusion initially, but this resolved.  She is agreeable to proceeding with next infusion. Sb  4/4/25: Ms. Alvarado returns to GI clinic for follow-up of ileal Crohn's disease.  She has been on Skyrizi.  At last visit, she was placed on back on budesonide as a temporizing measure waiting for Skyrizi to catch up.  She has been doing better on Skyrizi.  Abdominal pain and diarrhea have mostly resolved.  She is off budesonide.  She has improved energy.  She has gained a bit of weight as she is eating better.  She actually has some constipation now.  6/3/25 Margie presents for possible crohns flare. ate wings thurs. some cramping starting friday. now, cramping/fatigue/febrile/tenesmus/loose stool. she is very frustrated as she has been doing so well on skyrizi. sb

## 2025-06-12 ENCOUNTER — ERX REFILL RESPONSE (OUTPATIENT)
Dept: URBAN - NONMETROPOLITAN AREA CLINIC 2 | Facility: CLINIC | Age: 60
End: 2025-06-12

## 2025-06-12 RX ORDER — DICYCLOMINE HYDROCHLORIDE 10 MG/1
1 CAPSULES CAPSULE ORAL
Qty: 60 | Refills: 6 | OUTPATIENT

## 2025-06-12 RX ORDER — DICYCLOMINE HYDROCHLORIDE 10 MG/1
TAKE ONE CAPSULE BY MOUTH TWICE A DAY FOR 30 DAYS CAPSULE ORAL
Qty: 60 CAPSULE | Refills: 6 | OUTPATIENT

## 2025-07-16 ENCOUNTER — OFFICE VISIT (OUTPATIENT)
Dept: URBAN - NONMETROPOLITAN AREA CLINIC 2 | Facility: CLINIC | Age: 60
End: 2025-07-16